# Patient Record
Sex: MALE | Race: WHITE | Employment: FULL TIME | URBAN - METROPOLITAN AREA
[De-identification: names, ages, dates, MRNs, and addresses within clinical notes are randomized per-mention and may not be internally consistent; named-entity substitution may affect disease eponyms.]

---

## 2022-09-27 ENCOUNTER — HOSPITAL ENCOUNTER (OUTPATIENT)
Age: 57
Discharge: HOME | End: 2022-09-27
Payer: COMMERCIAL

## 2022-09-27 ENCOUNTER — APPOINTMENT (OUTPATIENT)
Dept: GENERAL RADIOLOGY | Age: 57
End: 2022-09-27
Attending: PHYSICIAN ASSISTANT
Payer: COMMERCIAL

## 2022-09-27 VITALS
SYSTOLIC BLOOD PRESSURE: 160 MMHG | TEMPERATURE: 97 F | HEART RATE: 66 BPM | DIASTOLIC BLOOD PRESSURE: 70 MMHG | HEIGHT: 73 IN | BODY MASS INDEX: 26.51 KG/M2 | OXYGEN SATURATION: 98 % | RESPIRATION RATE: 18 BRPM | WEIGHT: 200 LBS

## 2022-09-27 DIAGNOSIS — S22.31XA CLOSED FRACTURE OF ONE RIB OF RIGHT SIDE, INITIAL ENCOUNTER: ICD-10-CM
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
## 2022-09-27 PROCEDURE — 71101 X-RAY EXAM UNILAT RIBS/CHEST: CPT | Performed by: PHYSICIAN ASSISTANT

## 2022-09-27 PROCEDURE — 99203 OFFICE O/P NEW LOW 30 MIN: CPT | Performed by: PHYSICIAN ASSISTANT

## 2022-09-27 PROCEDURE — A9284 NON-ELECTRONIC SPIROMETER: HCPCS | Performed by: PHYSICIAN ASSISTANT

## 2022-09-27 RX ORDER — DOCUSATE SODIUM 100 MG/1
100 CAPSULE, LIQUID FILLED ORAL 2 TIMES DAILY
Qty: 60 CAPSULE | Refills: 0 | Status: SHIPPED | OUTPATIENT
Start: 2022-09-27 | End: 2022-10-27

## 2022-09-27 RX ORDER — LIDOCAINE 50 MG/G
1 PATCH TOPICAL EVERY 24 HOURS
Qty: 10 PATCH | Refills: 0 | Status: SHIPPED | OUTPATIENT
Start: 2022-09-27 | End: 2022-10-07

## 2022-09-27 RX ORDER — HYDROCODONE BITARTRATE AND ACETAMINOPHEN 5; 325 MG/1; MG/1
1-2 TABLET ORAL EVERY 6 HOURS PRN
Qty: 10 TABLET | Refills: 0 | Status: SHIPPED | OUTPATIENT
Start: 2022-09-27 | End: 2022-10-02

## 2022-09-27 NOTE — ED INITIAL ASSESSMENT (HPI)
C/o right sided rib pain after mechanical fall onto railing while playing football on Sunday. Rates pain 10/10. Reports SOB at times.  Ibuprofen 600 ml taken at 0800

## 2022-10-03 ENCOUNTER — OFFICE VISIT (OUTPATIENT)
Dept: INTERNAL MEDICINE CLINIC | Facility: CLINIC | Age: 57
End: 2022-10-03
Payer: COMMERCIAL

## 2022-10-03 VITALS
DIASTOLIC BLOOD PRESSURE: 76 MMHG | HEART RATE: 70 BPM | SYSTOLIC BLOOD PRESSURE: 140 MMHG | BODY MASS INDEX: 29.16 KG/M2 | HEIGHT: 73 IN | OXYGEN SATURATION: 99 % | WEIGHT: 220 LBS

## 2022-10-03 DIAGNOSIS — R03.0 ELEVATED BLOOD PRESSURE READING: ICD-10-CM

## 2022-10-03 DIAGNOSIS — S22.32XD CLOSED FRACTURE OF ONE RIB OF LEFT SIDE WITH ROUTINE HEALING, SUBSEQUENT ENCOUNTER: Primary | ICD-10-CM

## 2022-10-03 DIAGNOSIS — Z12.11 COLON CANCER SCREENING: ICD-10-CM

## 2022-10-03 PROCEDURE — 3077F SYST BP >= 140 MM HG: CPT | Performed by: NURSE PRACTITIONER

## 2022-10-03 PROCEDURE — 3078F DIAST BP <80 MM HG: CPT | Performed by: NURSE PRACTITIONER

## 2022-10-03 PROCEDURE — 99203 OFFICE O/P NEW LOW 30 MIN: CPT | Performed by: NURSE PRACTITIONER

## 2022-10-03 PROCEDURE — 3008F BODY MASS INDEX DOCD: CPT | Performed by: NURSE PRACTITIONER

## 2022-10-07 ENCOUNTER — TELEPHONE (OUTPATIENT)
Dept: INTERNAL MEDICINE CLINIC | Facility: CLINIC | Age: 57
End: 2022-10-07

## 2022-10-07 NOTE — TELEPHONE ENCOUNTER
Condition update:  Patient seen by Channing CORTES for fractured rib. Got a note to be off work for one week, supposed to go back today, but asking for note for one more week off, hoping to return to work 10/17. Still having significant pain from rib fracture, unable to get in and out of crawl spaces for his job. Channing CORTES, please advise on note to extend time off until 10/17?

## 2022-10-14 ENCOUNTER — TELEPHONE (OUTPATIENT)
Dept: INTERNAL MEDICINE CLINIC | Facility: CLINIC | Age: 57
End: 2022-10-14

## 2022-10-14 ENCOUNTER — TELEPHONE (OUTPATIENT)
Dept: ORTHOPEDICS CLINIC | Facility: CLINIC | Age: 57
End: 2022-10-14

## 2022-10-14 ENCOUNTER — PATIENT MESSAGE (OUTPATIENT)
Dept: INTERNAL MEDICINE CLINIC | Facility: CLINIC | Age: 57
End: 2022-10-14

## 2022-10-14 NOTE — TELEPHONE ENCOUNTER
S/w pt and he states he a couple weeks ago he was playing with his grandkids and fell on a metal rail. He went to  on 9/27 and had XR and was told rib fx. He went to see Bella Carreno who gave off work note but it expires on 10/17 and he needs a new note as he works and crawls in [address] [place] [address] and too difficult to do now. Also she put in ortho referral. He states he thinks Bella Carreno may extend the note and unsure if he really needs to see ortho and want to verify with her first. appt made on 10/18 @ 2pm, but he will CB and cx if not needed.

## 2022-10-14 NOTE — TELEPHONE ENCOUNTER
Verified name and . Patient states he attempted to get appointment with orthopedic specialist- no openings until 22- he states he is awaiting a phone call back from the office in regards to trying to get a sooner appointment. 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 He states he is improving but does not feel that he is able to lift his weight in and out of crawlspaces yet at this time. 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 \"    Letter pended in communications for your approval. He states that letter can be sent via [employer] if approved. Please advise and thank you.

## 2022-10-14 NOTE — TELEPHONE ENCOUNTER
Left message to call back. MyChart message also sent to patient. Noted 10/7/22 telephone encounter. Celia Fermin recommended patient be seen by Ortho for further evaluation and work excuse notes.

## 2022-10-14 NOTE — TELEPHONE ENCOUNTER
Yasmin Klein RN 10/14/2022 1:42 PM CDT        ----- Message -----  From: Malu Alegria  Sent: 10/14/2022 12:28 PM CDT  To: [facility]  Subject: Alma Judejarod     Please call me at [phone], thanks, Malu Alegria

## 2022-10-14 NOTE — TELEPHONE ENCOUNTER
Patient requesting an earlier appointment for fractured rib. Per [facility] CORTES internal medicine. No available appointments.  Please advise

## 2022-10-17 ENCOUNTER — TELEPHONE (OUTPATIENT)
Dept: INTERNAL MEDICINE CLINIC | Facility: CLINIC | Age: 57
End: 2022-10-17

## 2022-10-24 ENCOUNTER — TELEPHONE (OUTPATIENT)
Dept: INTERNAL MEDICINE CLINIC | Facility: CLINIC | Age: 57
End: 2022-10-24

## 2022-10-24 NOTE — TELEPHONE ENCOUNTER
Patient states that he received a note from [address] him from work until 10/24/22. He would like another letter stating that he can return to work on 10/26/22 and his work said they would put him on light duty for his fractured rib.      Letter pending your review and approval.

## 2022-10-26 NOTE — TELEPHONE ENCOUNTER
Patient received a note from [facility] before to be off of work however, patient is now requesting for note to return to work. Return to work date should reflect 10/31 with activity restriction to light duty due to his fractured rib. [facility] kindly assist with note.

## 2022-10-26 NOTE — TELEPHONE ENCOUNTER
Left message to call back on home voicemail, then spoke with patient on his cell phone, verified name/. Notified of Dr Mago Albarran message. He verbalized understanding. Message routed to [employer] APRSAVITA to address when returns to office.

## 2022-10-26 NOTE — TELEPHONE ENCOUNTER
Please review message again that I wrote. His provider is off till October 29, 2022 I have not seen the patient. So this will have to wait until his regular providers back.

## 2022-10-30 NOTE — TELEPHONE ENCOUNTER
Pt was advised any additional notes should be from ortho. See prior TE. He had an appt on 10/18 and cancelled. He should be eval by ortho and possible additional xrays. No further notes will be given.

## (undated) NOTE — LETTER
Date & Time: 9/27/2022, 12:17 PM  Patient: Mac Faulkner  Encounter Provider(s):    Cesario Rooney PA-C       To Whom It May Concern:    Mac Faulkner was seen and treated in our department on 9/27/2022. He can return to work 10/3/2022.      If you have any questions or concerns, please do not hesitate to call.        _____________________________  Physician/APC Signature

## (undated) NOTE — LETTER
10/7/2022    To Whom It May Concern:    Mayra Garcia is currently under my medical care and may not return to work at this time. Please excuse [employer] White for 7 days. He may return to work on 10/17/2022. Activity is restricted as follows: none. If you require additional information please contact our office.         Sincerely,    SEBASTIAN Onofre            Document generated by:  SEBASTIAN Onofre

## (undated) NOTE — LETTER
10/3/2022      To Whom It May Concern:    Cristo Stone is currently under my medical care and may not return to work at this time. Please excuse Cadence Wilson for 7 days. He may return to work on Monday 10/10/2022. Activity is restricted as follows: none. If you require additional information please contact our office. Sincerely,    SEBASTIAN Paredes      . sig    Document generated by:  SEBASTIAN Paredes

## (undated) NOTE — LETTER
[facility]  [address]  Dept: [phone]  Dept Fax: [phone]  Loc: [phone]       WORK STATUS WORKSHEET    NAME: Malu Alegria  DIAGNOSIS:    1. Fall    2. Closed fracture of one rib of right side, initial encounter        Next Appointment Date/Time: *** at [facility] DEPTS:2559::\"[facility]\"}   {WC WORK RESTRICTIONS:3094}    Follow Up:  {WC FOLLOW UP:2637}    Prescription for:  {PRESCRIPTION [facility]:0948444}    After Care Instructions:  {AFTER CARE INSTRUCTION:5505094}    Restrictions:  Lumbar Back Strain:  {LUMBAR BACK STRAIN RESTRICTIONS:4220276}  Upper Extremities:  {UPPER EXTREMITY RESTRICTIONS:2639}  Lower Extremities: {LOWER EXTREMITIES WORK RESTRICTIONS:2104709}  Wound:  {WOUND RESTRICTIONS:6545562}  Eye:  {EYE RESTRICTIONS:4282147}  Other-General:  {OTHER-GENERAL RESTRICTIONS:346033471}      Above instructions reviewed with Ashwiniallabelkis Raji. He verbalized understanding.     [unfilled]  9:49 AM  10/1/2022